# Patient Record
Sex: FEMALE | Race: BLACK OR AFRICAN AMERICAN | Employment: FULL TIME | URBAN - METROPOLITAN AREA
[De-identification: names, ages, dates, MRNs, and addresses within clinical notes are randomized per-mention and may not be internally consistent; named-entity substitution may affect disease eponyms.]

---

## 2019-01-01 ENCOUNTER — HOSPITAL ENCOUNTER (EMERGENCY)
Age: 24
Discharge: HOME | End: 2019-01-01
Attending: EMERGENCY MEDICINE
Payer: COMMERCIAL

## 2019-01-01 VITALS
OXYGEN SATURATION: 100 % | TEMPERATURE: 98.4 F | WEIGHT: 167 LBS | SYSTOLIC BLOOD PRESSURE: 124 MMHG | HEIGHT: 65 IN | BODY MASS INDEX: 27.82 KG/M2 | RESPIRATION RATE: 16 BRPM | DIASTOLIC BLOOD PRESSURE: 85 MMHG | HEART RATE: 89 BPM

## 2019-01-01 DIAGNOSIS — J01.00 ACUTE MAXILLARY SINUSITIS, RECURRENCE NOT SPECIFIED: ICD-10-CM

## 2019-01-01 DIAGNOSIS — R09.81 NASAL CONGESTION: Primary | ICD-10-CM

## 2019-01-01 PROCEDURE — 99282 EMERGENCY DEPT VISIT SF MDM: CPT

## 2019-01-01 RX ORDER — DIPHENHYDRAMINE HCL 25 MG
25 CAPSULE ORAL NIGHTLY PRN
Qty: 30 CAPSULE | Refills: 0 | Status: SHIPPED | OUTPATIENT
Start: 2019-01-01 | End: 2019-01-11

## 2019-01-01 RX ORDER — DOXYCYCLINE 100 MG/1
100 TABLET ORAL 2 TIMES DAILY
Qty: 14 TABLET | Refills: 0 | Status: SHIPPED | OUTPATIENT
Start: 2019-01-01 | End: 2019-01-08

## 2019-01-01 RX ORDER — FLUTICASONE PROPIONATE 50 MCG
1 SPRAY, SUSPENSION (ML) NASAL DAILY
Qty: 1 BOTTLE | Refills: 0 | Status: SHIPPED | OUTPATIENT
Start: 2019-01-01

## 2019-01-01 RX ORDER — PSEUDOEPHEDRINE HYDROCHLORIDE 30 MG/1
30 TABLET ORAL EVERY MORNING
Qty: 7 TABLET | Refills: 0 | Status: SHIPPED | OUTPATIENT
Start: 2019-01-01 | End: 2019-01-08

## 2019-01-01 RX ORDER — CETIRIZINE HYDROCHLORIDE 10 MG/1
10 TABLET ORAL DAILY
Qty: 30 TABLET | Refills: 0 | Status: SHIPPED | OUTPATIENT
Start: 2019-01-01 | End: 2019-01-31

## 2022-05-29 ENCOUNTER — HOSPITAL ENCOUNTER (EMERGENCY)
Age: 27
Discharge: HOME | End: 2022-05-29
Attending: STUDENT IN AN ORGANIZED HEALTH CARE EDUCATION/TRAINING PROGRAM
Payer: COMMERCIAL

## 2022-05-29 VITALS
HEART RATE: 58 BPM | TEMPERATURE: 97.7 F | DIASTOLIC BLOOD PRESSURE: 66 MMHG | RESPIRATION RATE: 15 BRPM | SYSTOLIC BLOOD PRESSURE: 124 MMHG | BODY MASS INDEX: 31.5 KG/M2 | OXYGEN SATURATION: 100 % | HEIGHT: 66 IN | WEIGHT: 196 LBS

## 2022-05-29 DIAGNOSIS — B02.9 HERPES ZOSTER WITHOUT COMPLICATION: Primary | ICD-10-CM

## 2022-05-29 LAB
GLUCOSE BLD-MCNC: 89 MG/DL (ref 70–99)
HCG(URINE) PREGNANCY TEST: NEGATIVE
PERFORMED ON: NORMAL

## 2022-05-29 PROCEDURE — 99283 EMERGENCY DEPT VISIT LOW MDM: CPT

## 2022-05-29 PROCEDURE — 84703 CHORIONIC GONADOTROPIN ASSAY: CPT

## 2022-05-29 RX ORDER — VALACYCLOVIR HYDROCHLORIDE 1 G/1
1000 TABLET, FILM COATED ORAL 3 TIMES DAILY
Qty: 21 TABLET | Refills: 0 | Status: SHIPPED | OUTPATIENT
Start: 2022-05-29 | End: 2022-06-05

## 2022-05-29 ASSESSMENT — ENCOUNTER SYMPTOMS
NAUSEA: 0
ABDOMINAL PAIN: 0
DIARRHEA: 0
CONSTIPATION: 0
COUGH: 0
EYES NEGATIVE: 1
SHORTNESS OF BREATH: 0

## 2022-05-29 ASSESSMENT — PAIN - FUNCTIONAL ASSESSMENT: PAIN_FUNCTIONAL_ASSESSMENT: NONE - DENIES PAIN

## 2022-05-29 NOTE — ED NOTES
Patient prepared for and ready to be discharged. Patient discharged at this time in no acute distress after verbalizing understanding of discharge instructions. Patient left after receiving After Visit Summary instructions.       Dennie Hand, RN  05/29/22 2804

## 2022-05-29 NOTE — ED PROVIDER NOTES
[facility] ENCOUNTER          PHYSICIAN ASSISTANT NOTE       Date of evaluation: 5/29/2022    Chief Complaint     Rash (Left flank area)    History of Present Illness     Jamar Mckeon is a 33 y.o. female who presents to the emergency department with chief complaint of rash. The patient states that approximately 3 days ago she noticed a rash on her left side and has now had a few other places popup moving around towards her left flank. The patient states that this rash has been super itchy however she has not taken anything for it. It is not particularly painful. She rates her pain score is 0 out of 10. She states she did not have any other symptoms associated with this rash. No prodromal symptoms. She did recently give birth and is currently breast-feeding. Otherwise denies fevers, chills, chest pain, back pain, headache. Patient gave birth early March of this year. Review of Systems     Review of Systems   Constitutional: Negative for chills and fever. HENT: Negative. Eyes: Negative. Respiratory: Negative for cough and shortness of breath. Cardiovascular: Negative for chest pain and palpitations. Gastrointestinal: Negative for abdominal pain, constipation, diarrhea and nausea. Genitourinary: Negative for dysuria, frequency, urgency, vaginal bleeding and vaginal discharge. Musculoskeletal: Negative. Skin: Positive for rash. Neurological: Negative. Hematological: Negative for adenopathy. Psychiatric/Behavioral: Negative. All other systems reviewed and are negative. Past Medical, Surgical, Family, and Social History     She has no past medical history on file. She has a past surgical history that includes [place] tooth extraction. Her family history is not on file. She reports that she has never smoked. She has never used smokeless tobacco. She reports previous alcohol use. She reports that she does not use drugs.     Medications Discharge Medication List as of 5/29/2022  1:03 PM      CONTINUE these medications which have NOT CHANGED    Details   fluticasone (FLONASE) 50 MCG/ACT nasal spray 1 spray by Each Nare route daily, Disp-1 Bottle, R-0Print      naproxen (NAPROSYN) 500 MG tablet Take 1 tablet by mouth 2 times daily as needed for Pain, Disp-60 tablet, R-0Print      ondansetron (ZOFRAN ODT) 4 MG disintegrating tablet Take 1 tablet by mouth every 8 hours as needed for Nausea or Vomiting, Disp-20 tablet, R-0Print             Allergies     She is allergic to amoxicillin and pcn [penicillins]. Physical Exam     INITIAL VITALS: BP: 124/66, Temp: 97.7 °F (36.5 °C), Heart Rate: 58, Resp: 15, SpO2: 100 %  Physical Exam  Vitals and nursing note reviewed. Constitutional:       General: She is not in acute distress. Appearance: She is well-developed. She is not diaphoretic. HENT:      Head: Normocephalic. Mouth/Throat:      Mouth: Mucous membranes are moist.      Pharynx: Oropharynx is clear. Eyes:      Pupils: Pupils are equal, round, and reactive to light. Cardiovascular:      Rate and Rhythm: Normal rate and regular rhythm. Pulses: Normal pulses. Heart sounds: Normal heart sounds. Pulmonary:      Effort: Pulmonary effort is normal.      Breath sounds: Normal breath sounds. No rales. Abdominal:      General: Abdomen is flat. Bowel sounds are normal. There is no distension. Palpations: Abdomen is soft. Tenderness: There is no abdominal tenderness. There is no guarding. Musculoskeletal:         General: Normal range of motion. Cervical back: Normal range of motion and neck supple. Lymphadenopathy:      Cervical: No cervical adenopathy. Skin:     General: Skin is warm and dry. Capillary Refill: Capillary refill takes less than 2 seconds. Findings: Rash present.       Comments: Patient has a pustular unilateral rash which has 3 areas of erythema and pustules wrapping from the patient's left side to her mid back. The first area of lesions is pustular while moving towards the spine in 1 dermatome it has not yet produced pustules. No other areas of involvement. No mucous membrane involvement. No pelvic or anal involvement. Neurological:      General: No focal deficit present. Mental Status: She is alert and oriented to person, place, and time. Psychiatric:         Mood and Affect: Mood normal.         Behavior: Behavior normal.         Diagnostic Results     RADIOLOGY:  No orders to display     LABS:   Results for orders placed or performed during the hospital encounter of 05/29/22   Pregnancy, Urine   Result Value Ref Range    HCG(Urine) Pregnancy Test Negative Detects HCG level >20 MIU/mL   POCT Glucose   Result Value Ref Range    POC Glucose 89 70 - 99 mg/dl    Performed on ACCU-CHEK        RECENT VITALS:  BP: 124/66, Temp: 97.7 °F (36.5 °C), Heart Rate: 58, Resp: 15, SpO2: 100 %     Procedures     None    ED Course     Nursing Notes, Past Medical Hx,Past Surgical Hx, Social Hx, Allergies, and Family Hx were reviewed. The patient was given the following medications:  Orders Placed This Encounter   Medications    valACYclovir (VALTREX) 1 g tablet     Sig: Take 1 tablet by mouth 3 times daily for 7 days     Dispense:  21 tablet     Refill:  0     CONSULTS:  None    MEDICAL DECISION MAKING / ASSESSMENT / Michael Pancho is a 33 y.o. female who presents emergency department with rash. My exam the patient is nontoxic-appearing. She is afebrile without tachycardia, tachypnea or hypoxia. She is normotensive. She has a vesicular rash to her left side extending from the left upper quadrant around to the left flank. There are 3 areas of erythema with vesicular lesions insistent with herpes zoster. The patient states that she did have complex as a child. She did recently give birth. No symptoms prior to the rash appearing.     I will treat the patient for herpes zoster with Valtrex. I did confirm with pharmacy that this medication is safe with lactation as long as there are no lesions around the nipples or breast.  This is the case with this patient. She is comfortable taking this medication. I did refer her to [facility]. outpatient clinic for follow-up as needed. She should return to the emergency department if she develops any fevers or chills or any other concerns she may have. Patient was ambulatory and p.o. tolerant at the time of discharge. This patient was also evaluated by the attending physician. All care plans were discussed and agreed upon. Clinical Impression     1.  Herpes zoster without complication        Disposition     PATIENT REFERRED TO:  [facility]  [address]  [phone]    Schedule an appointment as soon as possible for a visit   As needed    DISCHARGE MEDICATIONS:  Discharge Medication List as of 5/29/2022  1:03 PM      START taking these medications    Details   valACYclovir (VALTREX) 1 g tablet Take 1 tablet by mouth 3 times daily for 7 days, Disp-21 tablet, R-0Print             DISPOSITION Decision To Discharge 05/29/2022 01:00:44 PM     John Mills PA-C  05/29/22 1535

## 2022-05-29 NOTE — ED PROVIDER NOTES
ED Attending Attestation Note     Date of evaluation: 5/29/2022    This patient was seen by the advance practice provider. I have seen and examined the patient, agree with the workup, evaluation, management and diagnosis. The care plan has been discussed. My assessment [age]-year-old female presenting with chief complaint of rash to left flank. On exam patient with vesicular rash that appears to follow dermatome. Discussed differential diagnosis with the patient and our concern for shingles. Currently breast-feeding, discussed our recommendation to have patient immediately follow-up with pediatrician, cessation from direct contact with child.   Will discuss medication and breast feeding comparability with pharmacy     Mark Anthony Metzger MD  05/29/22 0682

## 2022-10-13 ENCOUNTER — HOSPITAL ENCOUNTER (EMERGENCY)
Age: 27
Discharge: HOME | End: 2022-10-14
Attending: EMERGENCY MEDICINE
Payer: MEDICAID

## 2022-10-13 ENCOUNTER — APPOINTMENT (OUTPATIENT)
Dept: CT IMAGING | Age: 27
End: 2022-10-13
Payer: MEDICAID

## 2022-10-13 DIAGNOSIS — K62.5 RECTAL BLEEDING: ICD-10-CM

## 2022-10-13 DIAGNOSIS — R10.9 ABDOMINAL PAIN, UNSPECIFIED ABDOMINAL LOCATION: Primary | ICD-10-CM

## 2022-10-13 LAB
ALBUMIN SERPL-MCNC: 4.5 G/DL (ref 3.4–5)
ALP BLD-CCNC: 119 U/L (ref 40–129)
ALT SERPL-CCNC: 9 U/L (ref 10–40)
ANION GAP SERPL CALCULATED.3IONS-SCNC: 10 MMOL/L (ref 3–16)
AST SERPL-CCNC: 14 U/L (ref 15–37)
BASOPHILS ABSOLUTE: 0.1 K/UL (ref 0–0.2)
BASOPHILS RELATIVE PERCENT: 1.2 %
BILIRUB SERPL-MCNC: <0.2 MG/DL (ref 0–1)
BILIRUBIN DIRECT: <0.2 MG/DL (ref 0–0.3)
BILIRUBIN URINE: NEGATIVE
BILIRUBIN, INDIRECT: ABNORMAL MG/DL (ref 0–1)
BLOOD, URINE: NEGATIVE
BUN BLDV-MCNC: 9 MG/DL (ref 7–20)
CALCIUM SERPL-MCNC: 9.2 MG/DL (ref 8.3–10.6)
CHLORIDE BLD-SCNC: 101 MMOL/L (ref 99–110)
CLARITY: CLEAR
CO2: 26 MMOL/L (ref 21–32)
COLOR: YELLOW
CREAT SERPL-MCNC: 0.8 MG/DL (ref 0.6–1.1)
EOSINOPHILS ABSOLUTE: 0.1 K/UL (ref 0–0.6)
EOSINOPHILS RELATIVE PERCENT: 2.7 %
EPITHELIAL CELLS, UA: ABNORMAL /HPF (ref 0–5)
GFR AFRICAN AMERICAN: >60
GFR NON-AFRICAN AMERICAN: >60
GLUCOSE BLD-MCNC: 107 MG/DL (ref 70–99)
GLUCOSE URINE: NEGATIVE MG/DL
HCG(URINE) PREGNANCY TEST: NEGATIVE
HCT VFR BLD CALC: 37.8 % (ref 36–48)
HEMOGLOBIN: 12.2 G/DL (ref 12–16)
KETONES, URINE: NEGATIVE MG/DL
LEUKOCYTE ESTERASE, URINE: NEGATIVE
LIPASE: 21 U/L (ref 13–60)
LYMPHOCYTES ABSOLUTE: 1.6 K/UL (ref 1–5.1)
LYMPHOCYTES RELATIVE PERCENT: 32.1 %
MCH RBC QN AUTO: 27.8 PG (ref 26–34)
MCHC RBC AUTO-ENTMCNC: 32.2 G/DL (ref 31–36)
MCV RBC AUTO: 86.3 FL (ref 80–100)
MICROSCOPIC EXAMINATION: ABNORMAL
MONOCYTES ABSOLUTE: 0.3 K/UL (ref 0–1.3)
MONOCYTES RELATIVE PERCENT: 6.9 %
NEUTROPHILS ABSOLUTE: 2.8 K/UL (ref 1.7–7.7)
NEUTROPHILS RELATIVE PERCENT: 57.1 %
NITRITE, URINE: NEGATIVE
PDW BLD-RTO: 12.9 % (ref 12.4–15.4)
PH UA: 6.5 (ref 5–8)
PLATELET # BLD: 228 K/UL (ref 135–450)
PMV BLD AUTO: 10.1 FL (ref 5–10.5)
POTASSIUM SERPL-SCNC: 4.2 MMOL/L (ref 3.5–5.1)
PROTEIN UA: NEGATIVE MG/DL
RBC # BLD: 4.38 M/UL (ref 4–5.2)
RBC UA: ABNORMAL /HPF (ref 0–4)
SODIUM BLD-SCNC: 137 MMOL/L (ref 136–145)
SPECIFIC GRAVITY UA: 1.02 (ref 1–1.03)
TOTAL PROTEIN: 7.1 G/DL (ref 6.4–8.2)
URINE TYPE: ABNORMAL
UROBILINOGEN, URINE: 0.2 E.U./DL
WBC # BLD: 4.9 K/UL (ref 4–11)
WBC UA: ABNORMAL /HPF (ref 0–5)

## 2022-10-13 PROCEDURE — 96374 THER/PROPH/DIAG INJ IV PUSH: CPT

## 2022-10-13 PROCEDURE — 80076 HEPATIC FUNCTION PANEL: CPT

## 2022-10-13 PROCEDURE — 81001 URINALYSIS AUTO W/SCOPE: CPT

## 2022-10-13 PROCEDURE — 74177 CT ABD & PELVIS W/CONTRAST: CPT

## 2022-10-13 PROCEDURE — 85025 COMPLETE CBC W/AUTO DIFF WBC: CPT

## 2022-10-13 PROCEDURE — 6360000002 HC RX W HCPCS: Performed by: EMERGENCY MEDICINE

## 2022-10-13 PROCEDURE — 83690 ASSAY OF LIPASE: CPT

## 2022-10-13 PROCEDURE — 80048 BASIC METABOLIC PNL TOTAL CA: CPT

## 2022-10-13 PROCEDURE — 84703 CHORIONIC GONADOTROPIN ASSAY: CPT

## 2022-10-13 PROCEDURE — 99285 EMERGENCY DEPT VISIT HI MDM: CPT

## 2022-10-13 PROCEDURE — 6360000004 HC RX CONTRAST MEDICATION: Performed by: EMERGENCY MEDICINE

## 2022-10-13 RX ORDER — MORPHINE SULFATE 4 MG/ML
4 INJECTION, SOLUTION INTRAMUSCULAR; INTRAVENOUS ONCE
Status: COMPLETED | OUTPATIENT
Start: 2022-10-13 | End: 2022-10-13

## 2022-10-13 RX ADMIN — MORPHINE SULFATE 4 MG: 4 INJECTION, SOLUTION INTRAMUSCULAR; INTRAVENOUS at 23:45

## 2022-10-13 RX ADMIN — IOPAMIDOL 80 ML: 755 INJECTION, SOLUTION INTRAVENOUS at 23:46

## 2022-10-13 ASSESSMENT — PAIN DESCRIPTION - ORIENTATION: ORIENTATION: RIGHT

## 2022-10-13 ASSESSMENT — PAIN DESCRIPTION - LOCATION: LOCATION: ABDOMEN

## 2022-10-13 ASSESSMENT — PAIN DESCRIPTION - PAIN TYPE: TYPE: ACUTE PAIN

## 2022-10-13 ASSESSMENT — PAIN SCALES - GENERAL: PAINLEVEL_OUTOF10: 6

## 2022-10-13 ASSESSMENT — PAIN - FUNCTIONAL ASSESSMENT: PAIN_FUNCTIONAL_ASSESSMENT: 0-10

## 2022-10-14 VITALS
OXYGEN SATURATION: 100 % | DIASTOLIC BLOOD PRESSURE: 91 MMHG | RESPIRATION RATE: 16 BRPM | HEIGHT: 66 IN | BODY MASS INDEX: 30.53 KG/M2 | TEMPERATURE: 98.2 F | HEART RATE: 71 BPM | WEIGHT: 190 LBS | SYSTOLIC BLOOD PRESSURE: 148 MMHG

## 2022-10-14 RX ORDER — DICYCLOMINE HYDROCHLORIDE 10 MG/1
10 CAPSULE ORAL 4 TIMES DAILY PRN
Qty: 30 CAPSULE | Refills: 0 | Status: SHIPPED | OUTPATIENT
Start: 2022-10-14

## 2022-10-14 NOTE — DISCHARGE INSTRUCTIONS
Your testing in the emergency department did not determine a cause of your pain. Please take Bentyl as needed for pain. Follow-up with a primary care provider of your choice for repeat evaluation and further testing if symptoms continue.

## 2022-10-14 NOTE — ED PROVIDER NOTES
[address]          ATTENDING PHYSICIAN NOTE       Date of evaluation: 10/13/2022    Chief Complaint     Rectal Bleeding and Abdominal Pain (Recently seen at [facility], pain not resolved, bleeding new.)      History of Present Illness     Selina Nair is a 33 y.o. female who presents emergency department with a complaint of right-sided flank and abdominal pain. The patient reports on Saturday she began having which she describes as a pressure-like sensation in her right flank nonradiating lasting for several minutes at a time seems to be exacerbated by movements and occasionally taking a big breath in. She then developed over the course of the past 24 hours several episodes of hematochezia without associated rectal pain or perirectal pain. She reports no recent fevers chills nausea vomiting denies any dysuria urinary frequency. She was seen at [facility] hospital 2 days after she began having this right flank pain and had an evaluation there which included a urinalysis which was unremarkable was ultimately discharged home is likely having a musculoskeletal injury. She reported she did not get any relief with the Toradol that she was administered and did not fill the prescription that she was prescribed. Review of Systems     As documented in the HPI, otherwise all other systems were reviewed and were negative. Past Medical, Surgical, Family, and Social History     She has no past medical history on file. She has a past surgical history that includes [place] tooth extraction. Her family history is not on file. She reports that she has never smoked. She has never used smokeless tobacco. She reports that she does not currently use alcohol. She reports that she does not use drugs.     Medications     Previous Medications    FLUTICASONE (FLONASE) 50 MCG/ACT NASAL SPRAY    1 spray by Each Nare route daily    NAPROXEN (NAPROSYN) 500 MG TABLET    Take 1 tablet by mouth 2 times daily as needed for Pain    ONDANSETRON (ZOFRAN ODT) 4 MG DISINTEGRATING TABLET    Take 1 tablet by mouth every 8 hours as needed for Nausea or Vomiting       Allergies     She is allergic to amoxicillin and pcn [penicillins]. Physical Exam     INITIAL VITALS: BP: (!) 156/99, Temp: 98.2 °F (36.8 °C), Heart Rate: 75, Resp: 16, SpO2: 100 %   General: 27-year-old female sitting in bed no apparent cardiorespiratory distress  HEENT:  head is atraumatic, pupils equal round and reactive to light, sclera are clear, oropharynx is nonerythematous  Neck: supple, no lymphadenopathy  Chest: nonlabored respirations, equal chest rise bilaterally, no accessory muscle use  Cardiovascular: Regular, rate, and rhythm, 2+ radial pulses bilaterally, capillary refill 2 seconds  Abdominal: Soft,, no tenderness to palpation in the anterior abdomen she does have right CVA tenderness, no abdominal distention noted, no rebound or guarding  Skin: Warm, dry well perfused, no rashes  Musculoskeletal: no obvious deformities, no tenderness to palpation diffusely  Neurologic:  alert and oriented x4, speech is clear and intact without dysarthria, gait is intact  Rectal: There is a nonthrombosed external hemorrhoid noted no evidence of fissures, soft brown stool in the vault    Diagnostic Results     RADIOLOGY:  CT ABDOMEN PELVIS W IV CONTRAST Additional Contrast? None    (Results Pending)       LABS:   No results found for this visit on 10/13/22. ED BEDSIDE ULTRASOUND:  No results found. RECENT VITALS:  BP: (!) 156/99, Temp: 98.2 °F (36.8 °C), Heart Rate: 75, Resp: 16, SpO2: 100 %       ED Course     Nursing Notes, Past Medical Hx, Past Surgical Hx, Social Hx, Allergies, and Family Hx were reviewed.     The patient was given the following medications:  Orders Placed This Encounter   Medications    morphine injection 4 mg       CONSULTS:  None    MEDICAL DECISION MAKING / ASSESSMENT / Lisette Maria Esther is a 33 y.o. female who presented to the emergency department with a complaint of right-sided flank pain and rectal bleeding. On physical examination had overall benign anterior abdominal exam but did have some reproducible right CVA tenderness. The patient had a recent evaluation which included a negative urinalysis at outside hospital patient was presumptively diagnosed as having a musculoskeletal injury however her symptoms do not appear to be entirely consistent with that diagnosis. The patient at this point time has laboratory vesication's pending as well as a CT of the abdomen pelvis to further evaluate the cause of her abdominal pain. I will turn the patient over to an oncoming provider will follow up the remainder of her evaluation and reassess the patient following administration of pain medication. Clinical Impression     1. Abdominal pain, unspecified abdominal location    2. Rectal bleeding        Disposition     PATIENT REFERRED TO:  No follow-up provider specified.     DISCHARGE MEDICATIONS:  New Prescriptions    No medications on file       DISPOSITION             Wallace Templeton MD  10/13/22 9531

## 2022-10-14 NOTE — ED PROVIDER NOTES
[address] [facility]          ATTENDING PHYSICIAN NOTE       Date of evaluation: 10/13/2022    ADDENDUM:      Care of this patient was assumed from Dr. Karen Figueora. The patient was seen for Rectal Bleeding and Abdominal Pain (Recently seen at [facility], pain not resolved, bleeding new.)  . The patient's initial evaluation and plan have been discussed with the prior provider who initially evaluated the patient. Nursing Notes, Past Medical Hx, Past Surgical Hx, Social Hx, Allergies, and Family Hx were all reviewed. Patient is a 31-year-old female who presents for evaluation of right flank and abdominal pain. Patient describes intermittent pain is been going on for the last 5 days. Patient was seen in outside hospital where she had an unremarkable urinalysis. Patient also noted 2 episodes of hematochezia at home. On arrival, patient is hemodynamically stable. She has a soft abdomen with reproducible right CVA tenderness. Rectal exam demonstrates no acute bleeding. At time of turnover, laboratory studies and CT scan of the abdomen and pelvis was pending. Diagnostic Results     RADIOLOGY:  CT ABDOMEN PELVIS W IV CONTRAST Additional Contrast? None   Final Result      No acute abdominopelvic abnormality. INCIDENTAL FINDINGS: None.           LABS:   Results for orders placed or performed during the hospital encounter of 10/13/22   CBC with Auto Differential   Result Value Ref Range    WBC 4.9 4.0 - 11.0 K/uL    RBC 4.38 4.00 - 5.20 M/uL    Hemoglobin 12.2 12.0 - 16.0 g/dL    Hematocrit 37.8 36.0 - 48.0 %    MCV 86.3 80.0 - 100.0 fL    MCH 27.8 26.0 - 34.0 pg    MCHC 32.2 31.0 - 36.0 g/dL    RDW 12.9 12.4 - 15.4 %    Platelets 836 862 - 473 K/uL    MPV 10.1 5.0 - 10.5 fL    Neutrophils % 57.1 %    Lymphocytes % 32.1 %    Monocytes % 6.9 %    Eosinophils % 2.7 %    Basophils % 1.2 %    Neutrophils Absolute 2.8 1.7 - 7.7 K/uL    Lymphocytes Absolute 1.6 1.0 - 5.1 K/uL    Monocytes Absolute 0.3 0.0 - 1.3 K/uL    Eosinophils Absolute 0.1 0.0 - 0.6 K/uL    Basophils Absolute 0.1 0.0 - 0.2 K/uL   Basic Metabolic Panel   Result Value Ref Range    Sodium 137 136 - 145 mmol/L    Potassium 4.2 3.5 - 5.1 mmol/L    Chloride 101 99 - 110 mmol/L    CO2 26 21 - 32 mmol/L    Anion Gap 10 3 - 16    Glucose 107 (H) 70 - 99 mg/dL    BUN 9 7 - 20 mg/dL    Creatinine 0.8 0.6 - 1.1 mg/dL    GFR Non-African American >60 >60    GFR African American >60 >60    Calcium 9.2 8.3 - 10.6 mg/dL   Urinalysis with Microscopic   Result Value Ref Range    Color, UA Yellow Straw/Yellow    Clarity, UA Clear Clear    Glucose, Ur Negative Negative mg/dL    Bilirubin Urine Negative Negative    Ketones, Urine Negative Negative mg/dL    Specific Gravity, UA 1.020 1.005 - 1.030    Blood, Urine Negative Negative    pH, UA 6.5 5.0 - 8.0    Protein, UA Negative Negative mg/dL    Urobilinogen, Urine 0.2 <2.0 E.U./dL    Nitrite, Urine Negative Negative    Leukocyte Esterase, Urine Negative Negative    Microscopic Examination Not Indicated     Urine Type Voided     WBC, UA 0-2 0 - 5 /HPF    RBC, UA 3-4 0 - 4 /HPF    Epithelial Cells, UA 6-10 (A) 0 - 5 /HPF   Pregnancy, Urine   Result Value Ref Range    HCG(Urine) Pregnancy Test Negative Detects HCG level >20 MIU/mL   Hepatic Function Panel   Result Value Ref Range    Total Protein 7.1 6.4 - 8.2 g/dL    Albumin 4.5 3.4 - 5.0 g/dL    Alkaline Phosphatase 119 40 - 129 U/L    ALT 9 (L) 10 - 40 U/L    AST 14 (L) 15 - 37 U/L    Total Bilirubin <0.2 0.0 - 1.0 mg/dL    Bilirubin, Direct <0.2 0.0 - 0.3 mg/dL    Bilirubin, Indirect see below 0.0 - 1.0 mg/dL   Lipase   Result Value Ref Range    Lipase 21.0 13.0 - 60.0 U/L       RECENT VITALS:  BP: (!) 156/99, Temp: 98.2 °F (36.8 °C), Heart Rate: 75, Resp: 16, SpO2: 100 %     Procedures     N/A    ED Course          The patient was given the following medications:  Orders Placed This Encounter   Medications    morphine injection 4 mg    iopamidol (ISOVUE-370) 76 % injection 80 mL    dicyclomine (BENTYL) 10 MG capsule     Sig: Take 1 capsule by mouth 4 times daily as needed (Abdominal pain)     Dispense:  30 capsule     Refill:  0       CONSULTS:  None    MEDICAL DECISION MAKING / ASSESSMENT / Rosa Pedro is a 33 y.o. female who presents for evaluation of abdominal pain. Patient notes intermittent abdominal pain that has been present over the last 5 days. Patient describes crampy pain located on the right side of the abdomen that last for about 15 seconds at a time. Patient has a soft abdomen on exam with mild CVA tenderness. Laboratory studies are unremarkable. Urinalysis shows no evidence of infection. CT scan of the abdomen and pelvis shows no acute intra-abdominal pathology. It is unclear as the etiology of the patient's symptoms. Patient will be placed on Bentyl for symptom relief and will be instructed to follow-up with her primary care provider for GI referral if symptoms continue. As for the patient's rectal bleeding, she did have hemorrhoid present on exam which may be source of bleeding. She has no active bleeding at this time and has a stable hemoglobin. Patient will need GI follow-up at this continues as well. Clinical Impression     1. Abdominal pain, unspecified abdominal location    2.  Rectal bleeding        Disposition     PATIENT REFERRED TO:  Primary care provider of your choice          DISCHARGE MEDICATIONS:  New Prescriptions    DICYCLOMINE (BENTYL) 10 MG CAPSULE    Take 1 capsule by mouth 4 times daily as needed (Abdominal pain)       DISPOSITION Decision To Discharge 10/14/2022 12:13:10 AM         Rani Tracey MD  10/14/22 5215